# Patient Record
Sex: FEMALE | Race: WHITE | NOT HISPANIC OR LATINO | URBAN - METROPOLITAN AREA
[De-identification: names, ages, dates, MRNs, and addresses within clinical notes are randomized per-mention and may not be internally consistent; named-entity substitution may affect disease eponyms.]

---

## 2024-04-15 ENCOUNTER — APPOINTMENT (RX ONLY)
Dept: URBAN - METROPOLITAN AREA CLINIC 114 | Facility: CLINIC | Age: 29
Setting detail: DERMATOLOGY
End: 2024-04-15

## 2024-04-15 DIAGNOSIS — L73.2 HIDRADENITIS SUPPURATIVA: ICD-10-CM

## 2024-04-15 PROBLEM — L30.9 DERMATITIS, UNSPECIFIED: Status: ACTIVE | Noted: 2024-04-15

## 2024-04-15 PROCEDURE — ? COUNSELING

## 2024-04-15 PROCEDURE — 99203 OFFICE O/P NEW LOW 30 MIN: CPT

## 2024-04-15 PROCEDURE — ? DIAGNOSIS COMMENT

## 2024-04-15 PROCEDURE — ? DEFER

## 2024-04-15 ASSESSMENT — LOCATION ZONE DERM: LOCATION ZONE: AXILLAE

## 2024-04-15 ASSESSMENT — LOCATION DETAILED DESCRIPTION DERM: LOCATION DETAILED: LEFT AXILLARY VAULT

## 2024-04-15 ASSESSMENT — LOCATION SIMPLE DESCRIPTION DERM: LOCATION SIMPLE: LEFT AXILLARY VAULT

## 2024-04-15 NOTE — PROCEDURE: DIAGNOSIS COMMENT
Render Risk Assessment In Note?: yes
Comment: Due to development of multiple tender subcutaneous nodules in the axilla in the past few months, early stage HS needs to be considered.  Pt also states she has Ulcerative colitis. There is an association between inflammatory bowel disease (e.g. UC) and development of hidradenitis suppurativa. Pt would like the tender subcutaneous removed and biopsied. Explained that an excisional biopsy can be done on each nodule with one of our surgeons if desired however it is not generally recommended to be done when they are actively inflamed. Patient was given the option to have excisional biopsies scheduled with one of our surgeons  if desired or wait after it is no longer actively inflamed with recommended treatment options. \\Dexter questions answered. Patient defers all further evaluation/management options at this time.
Detail Level: Detailed

## 2024-04-15 NOTE — HPI: CYST
Is This A New Presentation, Or A Follow-Up?: Cysts
Additional History: Pt states she was seen at the ER 2 weeks ago and treated with an antibiotic course that she cannot remember the name of. Pt states that an ultrasound was done at the ER and the doctor told her it was epidermoid cysts and that she needed to see a dermatologist. Pt was also seen by her PCP who suggested they were lymph nodes.\\n\\nPt states that since they have been present, they are causing pain in her left arm and shoulder and there is no hair growing where the cysts are.

## 2024-04-15 NOTE — PROCEDURE: DEFER
Detail Level: Simple
Introduction Text (Please End With A Colon): The following procedure was deferred:
X Size Of Lesion In Cm (Optional): 0
Other Procedure: excisional biopsy
Scheduling Instructions (Optional): with one of our surgeons if patient decides to.
Procedure To Be Performed At Next Visit: Intralesional Kenalog
Detail Level: Zone
Other Procedure: prescription of oral antibiotics with anti-inflammatory properties